# Patient Record
Sex: FEMALE | Race: WHITE
[De-identification: names, ages, dates, MRNs, and addresses within clinical notes are randomized per-mention and may not be internally consistent; named-entity substitution may affect disease eponyms.]

---

## 2022-08-18 NOTE — NUR
Pt. was cooperative with assessment at bedside. She reports she is no longer 
taking Sertraline. Her medication reconciliation will be updated.

## 2022-08-18 NOTE — NUR
Assumed patient care. Patient is cooperative, no distress. [facility] is releasing 
her hold. She will be discharged to the [facility].
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
## 2022-10-31 ENCOUNTER — HOSPITAL ENCOUNTER (EMERGENCY)
Dept: HOSPITAL 94 - ER | Age: 27
Discharge: LEFT BEFORE DISCHARGE | End: 2022-10-31
Payer: COMMERCIAL

## 2022-10-31 VITALS — DIASTOLIC BLOOD PRESSURE: 84 MMHG | SYSTOLIC BLOOD PRESSURE: 128 MMHG

## 2022-10-31 VITALS — WEIGHT: 145.51 LBS | HEIGHT: 66 IN | BODY MASS INDEX: 23.38 KG/M2

## 2022-10-31 DIAGNOSIS — T74.21XA: ICD-10-CM

## 2022-10-31 DIAGNOSIS — F12.10: ICD-10-CM
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
## 2022-10-31 DIAGNOSIS — S93.402A: Primary | ICD-10-CM
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
## 2022-10-31 DIAGNOSIS — F17.200: ICD-10-CM

## 2022-10-31 DIAGNOSIS — F11.10: ICD-10-CM

## 2022-10-31 LAB — HCG UR QL: NEGATIVE

## 2022-10-31 PROCEDURE — 70450 CT HEAD/BRAIN W/O DYE: CPT

## 2022-10-31 PROCEDURE — 99285 EMERGENCY DEPT VISIT HI MDM: CPT

## 2022-10-31 PROCEDURE — 73610 X-RAY EXAM OF ANKLE: CPT

## 2022-10-31 PROCEDURE — 73630 X-RAY EXAM OF FOOT: CPT

## 2022-10-31 PROCEDURE — 81025 URINE PREGNANCY TEST: CPT

## 2022-10-31 PROCEDURE — 96372 THER/PROPH/DIAG INJ SC/IM: CPT
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
